# Patient Record
(demographics unavailable — no encounter records)

---

## 2020-06-02 NOTE — MRI
EXAM DESCRIPTION: Shoulder,Left



CLINICAL HISTORY: 77 years, Male, UNSPEC INJURY OF MUSCLE AND

TENDON ROTATOR CUFF LEFT SHOULDER



COMPARISON: Left shoulder radiograph 5/28/2020



TECHNIQUE: MRI of the left shoulder was performed with

multiplanar multi sequence imaging without intravenous contrast.



FINDINGS:



Rotator tendons: Supraspinatus tendon full-thickness tear of the

anterior leading age insertional fibers measuring up to 1 cm in

AP dimension, no significant tendon retraction due to the intact

superior tendon sheath. Additional high-grade near full-thickness

tear of the posterior supraspinatus critical zone extending

lateral and posteriorly into the anterior infraspinatus tendon.

Multifocal high-grade partial-thickness articular surface tears

of the infraspinatus insertional fibers (up to 60% tendon

thickness). There is degeneration and interstitial fissuring of

the infraspinatus myotendinous junction. There is chronic

avulsion of the teres minor tendon with severe atrophy of the

teres minor muscle which almost appears absent. Subscapularis

high-grade articular surface tears involving the middle

insertional fibers. Additional approximate 50% thickness tear of

the subscapularis articular surface myotendinous junction (series

301 image five). The subscapularis tendon is severely thinned and

degenerated with interstitial fissuring..



Rotator muscles: Minimal/grade 1 fatty atrophy and fatty

infiltration of the supraspinatus and infraspinatus muscle. The

teres muscle is absent, and favored to represent chronic atrophy.

Severe, grade 4 fatty atrophy of the subscapularis muscle.

The deltoid muscle bulk is intact.



Glenoid labrum: Severe circumferential degenerative tearing and

maceration of the glenoid labrum.



Acromion: The acromion morphology is type two. Mild

acromioclavicular joint osteoarthrosis with mild capsular

hypertrophy.



Bone and joints: Severe glenohumeral joint osteoarthrosis with

bulky humeral head marginal osteophyte formation (more pronounced

inferiorly), degenerative bony remodeling with flattening of the

humeral head and glenoid retroversion in addition to subchondral

sclerosis and small subchondral cystic changes. There is

full-thickness cartilage loss involving the glenohumeral joint.

Large subcortical cystic changes along the lateral humeral head

measures 1.6 cm. Degenerative bone marrow edema within the neck.

No acute fracture. Large joint effusion is subacromial/subdeltoid

bursa, subscapularis recess with secondary osteochondromatosis

and scattered loose cartilage bodies measuring up to 2.5 cm in

the subscapularis recess.



Biceps tendon: Chronic proximal biceps long head rupture with

distal tendon retraction..



Soft tissues: No solid or cystic mass, no axillary

lymphadenopathy.





IMPRESSION: 



1.  Supraspinatus full-thickness tear along the anterior leading

edge fibers. Additional multifocal high-grade partial-thickness

tears of the subscapularis and infraspinatus.

2.  Subscapularis high-grade multifocal partial-thickness tears.

Chronic complete teres minor tendon tear/avulsion.

3.  Supraspinatus and infraspinatus minimal muscle atrophy. There

is severe atrophy of the subscapularis and teres minor muscle

bellies.

4.  Severe/end-stage left glenohumeral joint osteoarthrosis with

full-thickness cartilage loss and circumferential glenoid labral

maceration.

5.  Large left shoulder joint effusion with multifocal secondary

osteochondromatosis and loose chondral bodies within the

subscapularis and subcoracoid recesses.

6.  Chronic proximal long head biceps rupture.



Electronically signed by:  Yuniel Juarez DO  6/2/2020 10:38 AM CDT

Workstation: [id number]